# Patient Record
Sex: FEMALE | Race: BLACK OR AFRICAN AMERICAN | NOT HISPANIC OR LATINO | URBAN - METROPOLITAN AREA
[De-identification: names, ages, dates, MRNs, and addresses within clinical notes are randomized per-mention and may not be internally consistent; named-entity substitution may affect disease eponyms.]

---

## 2018-03-19 ENCOUNTER — EMERGENCY (EMERGENCY)
Facility: HOSPITAL | Age: 28
LOS: 1 days | Discharge: LEFT BEFORE DISCHARGE | End: 2018-03-19
Attending: EMERGENCY MEDICINE | Admitting: EMERGENCY MEDICINE
Payer: COMMERCIAL

## 2018-03-19 VITALS
SYSTOLIC BLOOD PRESSURE: 107 MMHG | OXYGEN SATURATION: 100 % | TEMPERATURE: 98 F | HEART RATE: 70 BPM | DIASTOLIC BLOOD PRESSURE: 69 MMHG | WEIGHT: 167.99 LBS | RESPIRATION RATE: 18 BRPM
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
## 2018-03-19 DIAGNOSIS — Z3A.20 20 WEEKS GESTATION OF PREGNANCY: ICD-10-CM
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
## 2018-03-19 DIAGNOSIS — S93.401A SPRAIN OF UNSPECIFIED LIGAMENT OF RIGHT ANKLE, INITIAL ENCOUNTER: ICD-10-CM

## 2018-03-19 DIAGNOSIS — O9A.212 INJURY, POISONING AND CERTAIN OTHER CONSEQUENCES OF EXTERNAL CAUSES COMPLICATING PREGNANCY, SECOND TRIMESTER: ICD-10-CM
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
## 2018-03-19 PROCEDURE — 99282 EMERGENCY DEPT VISIT SF MDM: CPT

## 2018-03-19 PROCEDURE — 99283 EMERGENCY DEPT VISIT LOW MDM: CPT

## 2018-03-19 NOTE — ED ADULT NURSE NOTE - OBJECTIVE STATEMENT
A0 20wks pt presents to ED BIBA s/p mechanical trip and fall c/o 7/10 right ankle pain and swelling.  Pt denies LOC, head trauma or falling onto abdomen.  Pt denies N/V, vaginal bleeding or d/c.  Pt has not attempted to bear weight on right side.  Pt is CMS intact on exam.  Pt is pending XR.

## 2018-03-19 NOTE — ED PROVIDER NOTE - MEDICAL DECISION MAKING DETAILS
concern for ankle/ foot fracture.  plan to xray but patient refusing.  concerned about cost as she is traveling and wants to have eval once home.  understands that ankle/foot may have fracture and further damage could be done if not properly diagnosed and accepts.  also refusing fetal monitoring.  states she didn't hit her belly and her baby is fine.  to sign out ama.  also refusing walking boot/ crutches

## 2018-03-19 NOTE — ED ADULT TRIAGE NOTE - CHIEF COMPLAINT QUOTE
tripped and fell on uneven pavement hurting right ankle + swelling and pain;    20 weeks pregnant - denies vag bleed/discharge, abd/back/pelvic pain

## 2018-03-19 NOTE — ED ADULT NURSE REASSESSMENT NOTE - NS ED NURSE REASSESS COMMENT FT1
Pt expressed interest in leaving AMA.  Pt explained risks and benefits.  Pt verbalized understanding.  Pt educated about signs and symptoms that would require her immediate return to ED.  Pt verbalized understanding. Pt expressed interest in leaving AMA.  Pt explained risks and benefits of leaving ED w/o mother or child being evaluated w/ US or XR.  Pt verbalized understanding.  Pt educated about signs and symptoms that would require her immediate return to ED.  Pt verbalized understanding.

## 2018-03-19 NOTE — ED PROVIDER NOTE - OBJECTIVE STATEMENT
Swedish interpretation via  per patient request: 4 months pregnant here with pain in right ankle/foot.  Was getting out of cab when foot rolled to side.  Held onto cab and didn't fall down or hit belly.  Denies abd pain, vaginal bleeding.  Unable to ambulate due to pain.  No other injury.

## 2019-05-15 NOTE — ED ADULT NURSE NOTE - CAS DISCH BELONGINGS RETURNED
05/15/19 1413   C-SSRS (Frequent Screen)   2. Have you actually had any thoughts of killing yourself? No   6. Have you done anything, started to do anything, or prepared to do anything to end your life? No   Suicide Evaluation Negative screen= no ideation, behaviors or history   Nursing Suicide Assessment Note - Inpatient    Current assessment:    Current C-SSRS score: Negative screen= no ideation, behaviors or history      Protective Factors / Reason for Living: Responsibility to children    Interventions:   · Q 15 minute safety checks    Other Interventions Implemented:     Yes